# Patient Record
Sex: FEMALE | Race: WHITE | Employment: OTHER | URBAN - METROPOLITAN AREA
[De-identification: names, ages, dates, MRNs, and addresses within clinical notes are randomized per-mention and may not be internally consistent; named-entity substitution may affect disease eponyms.]

---

## 2017-08-16 ENCOUNTER — HOSPITAL ENCOUNTER (OUTPATIENT)
Dept: GENERAL RADIOLOGY | Facility: HOSPITAL | Age: 27
Discharge: HOME | End: 2017-08-16
Attending: NURSE PRACTITIONER
Payer: MEDICARE

## 2017-08-16 ENCOUNTER — LAB ENCOUNTER (OUTPATIENT)
Dept: LAB | Age: 27
End: 2017-08-16
Attending: NURSE PRACTITIONER
Payer: MEDICARE

## 2017-08-16 DIAGNOSIS — C83.50 T LYMPHOBLASTIC LYMPHOMA (HCC): ICD-10-CM

## 2017-08-16 LAB
ALBUMIN SERPL-MCNC: 3.8 G/DL (ref 3.5–4.8)
ALP LIVER SERPL-CCNC: 75 U/L (ref 37–98)
ALT SERPL-CCNC: 12 U/L (ref 14–54)
AST SERPL-CCNC: 9 U/L (ref 15–41)
BASOPHILS # BLD AUTO: 0.03 X10(3) UL (ref 0–0.1)
BASOPHILS NFR BLD AUTO: 0.4 %
BILIRUB SERPL-MCNC: 0.4 MG/DL (ref 0.1–2)
BUN BLD-MCNC: 5 MG/DL (ref 8–20)
CALCIUM BLD-MCNC: 8.7 MG/DL (ref 8.3–10.3)
CHLORIDE: 107 MMOL/L (ref 101–111)
CO2: 24 MMOL/L (ref 22–32)
CREAT BLD-MCNC: 0.6 MG/DL (ref 0.55–1.02)
EOSINOPHIL # BLD AUTO: 0.11 X10(3) UL (ref 0–0.3)
EOSINOPHIL NFR BLD AUTO: 1.6 %
ERYTHROCYTE [DISTWIDTH] IN BLOOD BY AUTOMATED COUNT: 12.1 % (ref 11.5–16)
GLUCOSE BLD-MCNC: 100 MG/DL (ref 70–99)
HCT VFR BLD AUTO: 40 % (ref 34–50)
HGB BLD-MCNC: 13.2 G/DL (ref 12–16)
IMMATURE GRANULOCYTE COUNT: 0.02 X10(3) UL (ref 0–1)
IMMATURE GRANULOCYTE RATIO %: 0.3 %
LYMPHOCYTES # BLD AUTO: 2.44 X10(3) UL (ref 0.9–4)
LYMPHOCYTES NFR BLD AUTO: 35.3 %
M PROTEIN MFR SERPL ELPH: 7.2 G/DL (ref 6.1–8.3)
MCH RBC QN AUTO: 30.3 PG (ref 27–33.2)
MCHC RBC AUTO-ENTMCNC: 33 G/DL (ref 31–37)
MCV RBC AUTO: 92 FL (ref 81–100)
MONOCYTES # BLD AUTO: 0.51 X10(3) UL (ref 0.1–0.6)
MONOCYTES NFR BLD AUTO: 7.4 %
NEUTROPHIL ABS PRELIM: 3.8 X10 (3) UL (ref 1.3–6.7)
NEUTROPHILS # BLD AUTO: 3.8 X10(3) UL (ref 1.3–6.7)
NEUTROPHILS NFR BLD AUTO: 55 %
PLATELET # BLD AUTO: 260 10(3)UL (ref 150–450)
POTASSIUM SERPL-SCNC: 3.7 MMOL/L (ref 3.6–5.1)
RBC # BLD AUTO: 4.35 X10(6)UL (ref 3.8–5.1)
RED CELL DISTRIBUTION WIDTH-SD: 40.8 FL (ref 35.1–46.3)
SODIUM SERPL-SCNC: 140 MMOL/L (ref 136–144)
WBC # BLD AUTO: 6.9 X10(3) UL (ref 4–13)

## 2017-08-16 PROCEDURE — 71020 XR CHEST PA + LAT CHEST (CPT=71020): CPT | Performed by: NURSE PRACTITIONER

## 2017-08-16 PROCEDURE — 85025 COMPLETE CBC W/AUTO DIFF WBC: CPT

## 2017-08-16 PROCEDURE — 36415 COLL VENOUS BLD VENIPUNCTURE: CPT

## 2017-08-16 PROCEDURE — 80053 COMPREHEN METABOLIC PANEL: CPT

## 2018-03-07 ENCOUNTER — LAB ENCOUNTER (OUTPATIENT)
Dept: LAB | Age: 28
End: 2018-03-07
Attending: NURSE PRACTITIONER
Payer: MEDICARE

## 2018-03-07 DIAGNOSIS — C83.50 T LYMPHOBLASTIC LYMPHOMA (HCC): ICD-10-CM

## 2018-03-07 LAB
ALBUMIN SERPL-MCNC: 3.9 G/DL (ref 3.5–4.8)
ALP LIVER SERPL-CCNC: 83 U/L (ref 37–98)
ALT SERPL-CCNC: 10 U/L (ref 14–54)
AST SERPL-CCNC: 7 U/L (ref 15–41)
BASOPHILS # BLD AUTO: 0.02 X10(3) UL (ref 0–0.1)
BASOPHILS NFR BLD AUTO: 0.3 %
BILIRUB SERPL-MCNC: 0.3 MG/DL (ref 0.1–2)
BUN BLD-MCNC: 5 MG/DL (ref 8–20)
CALCIUM BLD-MCNC: 8.6 MG/DL (ref 8.3–10.3)
CHLORIDE: 108 MMOL/L (ref 101–111)
CO2: 26 MMOL/L (ref 22–32)
CREAT BLD-MCNC: 0.56 MG/DL (ref 0.55–1.02)
EOSINOPHIL # BLD AUTO: 0.2 X10(3) UL (ref 0–0.3)
EOSINOPHIL NFR BLD AUTO: 2.8 %
ERYTHROCYTE [DISTWIDTH] IN BLOOD BY AUTOMATED COUNT: 12.1 % (ref 11.5–16)
GLUCOSE BLD-MCNC: 90 MG/DL (ref 70–99)
HCT VFR BLD AUTO: 39.6 % (ref 34–50)
HGB BLD-MCNC: 13.3 G/DL (ref 12–16)
IMMATURE GRANULOCYTE COUNT: 0.02 X10(3) UL (ref 0–1)
IMMATURE GRANULOCYTE RATIO %: 0.3 %
LYMPHOCYTES # BLD AUTO: 2.19 X10(3) UL (ref 0.9–4)
LYMPHOCYTES NFR BLD AUTO: 30.2 %
M PROTEIN MFR SERPL ELPH: 7.5 G/DL (ref 6.1–8.3)
MCH RBC QN AUTO: 30.9 PG (ref 27–33.2)
MCHC RBC AUTO-ENTMCNC: 33.6 G/DL (ref 31–37)
MCV RBC AUTO: 92.1 FL (ref 81–100)
MONOCYTES # BLD AUTO: 0.58 X10(3) UL (ref 0.1–1)
MONOCYTES NFR BLD AUTO: 8 %
NEUTROPHIL ABS PRELIM: 4.24 X10 (3) UL (ref 1.3–6.7)
NEUTROPHILS # BLD AUTO: 4.24 X10(3) UL (ref 1.3–6.7)
NEUTROPHILS NFR BLD AUTO: 58.4 %
PLATELET # BLD AUTO: 278 10(3)UL (ref 150–450)
POTASSIUM SERPL-SCNC: 4.3 MMOL/L (ref 3.6–5.1)
RBC # BLD AUTO: 4.3 X10(6)UL (ref 3.8–5.1)
RED CELL DISTRIBUTION WIDTH-SD: 41 FL (ref 35.1–46.3)
SODIUM SERPL-SCNC: 140 MMOL/L (ref 136–144)
WBC # BLD AUTO: 7.3 X10(3) UL (ref 4–13)

## 2018-03-07 PROCEDURE — 36415 COLL VENOUS BLD VENIPUNCTURE: CPT

## 2018-03-07 PROCEDURE — 80053 COMPREHEN METABOLIC PANEL: CPT

## 2018-03-07 PROCEDURE — 85025 COMPLETE CBC W/AUTO DIFF WBC: CPT

## 2021-03-05 PROBLEM — R63.0 ANOREXIA: Status: ACTIVE | Noted: 2021-03-05

## 2021-03-05 PROBLEM — F50.20 BULIMIA: Status: ACTIVE | Noted: 2021-03-05

## 2021-03-05 PROBLEM — F50.2 BULIMIA (HCC): Status: ACTIVE | Noted: 2021-03-05

## 2021-03-05 PROBLEM — F50.2 BULIMIA: Status: ACTIVE | Noted: 2021-03-05

## 2021-09-08 ENCOUNTER — LAB ENCOUNTER (OUTPATIENT)
Dept: LAB | Age: 31
End: 2021-09-08
Attending: NURSE PRACTITIONER
Payer: MEDICARE

## 2021-09-08 DIAGNOSIS — Z92.21 HISTORY OF CHEMOTHERAPY: ICD-10-CM

## 2021-09-08 DIAGNOSIS — C83.50 T LYMPHOBLASTIC LYMPHOMA (HCC): ICD-10-CM

## 2021-09-08 LAB
ALBUMIN SERPL-MCNC: 3.5 G/DL (ref 3.4–5)
ALBUMIN/GLOB SERPL: 0.9 {RATIO} (ref 1–2)
ALP LIVER SERPL-CCNC: 104 U/L
ALT SERPL-CCNC: 19 U/L
ANION GAP SERPL CALC-SCNC: 8 MMOL/L (ref 0–18)
AST SERPL-CCNC: 21 U/L (ref 15–37)
BASOPHILS # BLD AUTO: 0.03 X10(3) UL (ref 0–0.2)
BASOPHILS NFR BLD AUTO: 0.6 %
BILIRUB SERPL-MCNC: 0.2 MG/DL (ref 0.1–2)
BUN BLD-MCNC: 4 MG/DL (ref 7–18)
CALCIUM BLD-MCNC: 8.7 MG/DL (ref 8.5–10.1)
CHLORIDE SERPL-SCNC: 112 MMOL/L (ref 98–112)
CO2 SERPL-SCNC: 20 MMOL/L (ref 21–32)
CREAT BLD-MCNC: 0.66 MG/DL
DEPRECATED HBV CORE AB SER IA-ACNC: 4.1 NG/ML
EOSINOPHIL # BLD AUTO: 0.09 X10(3) UL (ref 0–0.7)
EOSINOPHIL NFR BLD AUTO: 1.9 %
ERYTHROCYTE [DISTWIDTH] IN BLOOD BY AUTOMATED COUNT: 18.7 %
GLOBULIN PLAS-MCNC: 3.8 G/DL (ref 2.8–4.4)
GLUCOSE BLD-MCNC: 74 MG/DL (ref 70–99)
HCT VFR BLD AUTO: 29.8 %
HGB BLD-MCNC: 8.3 G/DL
IMM GRANULOCYTES # BLD AUTO: 0.01 X10(3) UL (ref 0–1)
IMM GRANULOCYTES NFR BLD: 0.2 %
IRON SATURATION: 3 %
IRON SERPL-MCNC: 20 UG/DL
LYMPHOCYTES # BLD AUTO: 1.09 X10(3) UL (ref 1–4)
LYMPHOCYTES NFR BLD AUTO: 23.3 %
M PROTEIN MFR SERPL ELPH: 7.3 G/DL (ref 6.4–8.2)
MAGNESIUM SERPL-MCNC: 1.9 MG/DL (ref 1.6–2.6)
MCH RBC QN AUTO: 20 PG (ref 26–34)
MCHC RBC AUTO-ENTMCNC: 27.9 G/DL (ref 31–37)
MCV RBC AUTO: 72 FL
MONOCYTES # BLD AUTO: 0.51 X10(3) UL (ref 0.1–1)
MONOCYTES NFR BLD AUTO: 10.9 %
NEUTROPHILS # BLD AUTO: 2.95 X10 (3) UL (ref 1.5–7.7)
NEUTROPHILS # BLD AUTO: 2.95 X10(3) UL (ref 1.5–7.7)
NEUTROPHILS NFR BLD AUTO: 63.1 %
OSMOLALITY SERPL CALC.SUM OF ELEC: 286 MOSM/KG (ref 275–295)
PATIENT FASTING Y/N/NP: YES
PHOSPHATE SERPL-MCNC: 4.5 MG/DL (ref 2.5–4.9)
PLATELET # BLD AUTO: 467 10(3)UL (ref 150–450)
POTASSIUM SERPL-SCNC: 4 MMOL/L (ref 3.5–5.1)
PREALB SERPL-MCNC: 24.5 MG/DL (ref 20–40)
RBC # BLD AUTO: 4.14 X10(6)UL
SODIUM SERPL-SCNC: 140 MMOL/L (ref 136–145)
TOTAL IRON BINDING CAPACITY: 586 UG/DL (ref 240–450)
TRANSFERRIN SERPL-MCNC: 393 MG/DL (ref 200–360)
TSI SER-ACNC: 1.05 MIU/ML (ref 0.36–3.74)
VIT D+METAB SERPL-MCNC: 77.1 NG/ML (ref 30–100)
WBC # BLD AUTO: 4.7 X10(3) UL (ref 4–11)

## 2021-09-08 PROCEDURE — 82728 ASSAY OF FERRITIN: CPT

## 2021-09-08 PROCEDURE — 83735 ASSAY OF MAGNESIUM: CPT

## 2021-09-08 PROCEDURE — 83540 ASSAY OF IRON: CPT

## 2021-09-08 PROCEDURE — 80053 COMPREHEN METABOLIC PANEL: CPT

## 2021-09-08 PROCEDURE — 84100 ASSAY OF PHOSPHORUS: CPT

## 2021-09-08 PROCEDURE — 83550 IRON BINDING TEST: CPT

## 2021-09-08 PROCEDURE — 84443 ASSAY THYROID STIM HORMONE: CPT

## 2021-09-08 PROCEDURE — 84134 ASSAY OF PREALBUMIN: CPT

## 2021-09-08 PROCEDURE — 82306 VITAMIN D 25 HYDROXY: CPT

## 2021-09-08 PROCEDURE — 36415 COLL VENOUS BLD VENIPUNCTURE: CPT

## 2021-09-08 PROCEDURE — 85025 COMPLETE CBC W/AUTO DIFF WBC: CPT

## 2021-11-04 ENCOUNTER — OFFICE VISIT (OUTPATIENT)
Dept: NEUROLOGY | Facility: CLINIC | Age: 31
End: 2021-11-04
Payer: MEDICARE

## 2021-11-04 VITALS
SYSTOLIC BLOOD PRESSURE: 118 MMHG | BODY MASS INDEX: 20 KG/M2 | DIASTOLIC BLOOD PRESSURE: 66 MMHG | RESPIRATION RATE: 16 BRPM | HEART RATE: 96 BPM | WEIGHT: 120 LBS

## 2021-11-04 DIAGNOSIS — M54.16 LEFT LUMBAR RADICULOPATHY: Primary | ICD-10-CM

## 2021-11-04 DIAGNOSIS — M79.605 LEFT LEG PAIN: ICD-10-CM

## 2021-11-04 DIAGNOSIS — R20.2 PARESTHESIA: ICD-10-CM

## 2021-11-04 PROCEDURE — 99204 OFFICE O/P NEW MOD 45 MIN: CPT | Performed by: OTHER

## 2021-11-04 RX ORDER — GABAPENTIN 100 MG/1
100 CAPSULE ORAL 3 TIMES DAILY
Qty: 90 CAPSULE | Refills: 2 | Status: SHIPPED | OUTPATIENT
Start: 2021-11-04 | End: 2022-02-07

## 2021-11-04 RX ORDER — FLUCONAZOLE 100 MG/1
100 TABLET ORAL DAILY
COMMUNITY
Start: 2021-11-03 | End: 2022-01-17

## 2021-11-04 RX ORDER — FLUTICASONE PROPIONATE 50 MCG
2 SPRAY, SUSPENSION (ML) NASAL DAILY
COMMUNITY
Start: 2021-04-02 | End: 2022-01-17

## 2021-11-04 RX ORDER — AZELASTINE 1 MG/ML
1 SPRAY, METERED NASAL DAILY
COMMUNITY
Start: 2021-04-02 | End: 2022-01-17

## 2021-11-04 NOTE — PROGRESS NOTES
HPI:    Patient ID: Darshana Quarles is a 32year old female.   Referring provider: Amos OBRIEN    HPI   Bernardo is a 24-year-old female with history of T-cell lymphoblastic lymphoma in remission and anxiety and depression who presents for evaluat Maternal Grandfather    • Hypertension Paternal Grandmother    • Lipids Paternal Grandmother    • Heart Disorder Paternal Grandfather         MI   • Hypertension Paternal Grandfather    • Cancer Father    • Other (Other) Father         Cyst   • Cancer [place] Bowel sounds are normal.   Musculoskeletal: Negative SLR on the left    Neurological  Mental Status Exam: Patient is awake, alert and oriented to person, place and time with normal memory, fund of knowledge, attention/concentration and language.     Cranial

## 2021-11-04 NOTE — PATIENT INSTRUCTIONS
After your visit at the [facility] office  today, please direct any follow up questions or medication needs to the staff in our [place] office so that your concerns may be promptly addressed.   We are available through [employer] or at the numbers below: be picked up in office. • Please allow the office 2-3 business days to fill the prescription. • Patient must present photo ID at time of . PLEASE NOTE: PRESCRIPTIONS MUST BE PICKED UP PRIOR TO 3:00PM MONDAY-FRIDAY    Scheduling Tests:     If your submitting forms to office staff. • Form completion may require an additional fee. • A signed Release of Information (DUSTIN) must be on file before forms may be submitted. When dropping off forms, please ask the  for this paper.    • Failure

## 2021-11-09 ENCOUNTER — HOSPITAL ENCOUNTER (OUTPATIENT)
Dept: CV DIAGNOSTICS | Facility: HOSPITAL | Age: 31
End: 2021-11-09
Attending: PEDIATRICS
Payer: MEDICARE

## 2021-11-09 ENCOUNTER — HOSPITAL ENCOUNTER (OUTPATIENT)
Dept: MRI IMAGING | Facility: HOSPITAL | Age: 31
Discharge: HOME | End: 2021-11-09
Attending: Other
Payer: MEDICARE

## 2021-11-09 DIAGNOSIS — R20.2 PARESTHESIA: ICD-10-CM

## 2021-11-09 DIAGNOSIS — M54.16 LEFT LUMBAR RADICULOPATHY: ICD-10-CM

## 2021-11-09 PROCEDURE — 72148 MRI LUMBAR SPINE W/O DYE: CPT | Performed by: OTHER

## 2021-11-16 DIAGNOSIS — M54.16 LEFT LUMBAR RADICULOPATHY: Primary | ICD-10-CM

## 2021-11-29 ENCOUNTER — TELEPHONE (OUTPATIENT)
Dept: NEUROLOGY | Facility: CLINIC | Age: 31
End: 2021-11-29

## 2021-11-29 DIAGNOSIS — C83.50 LYMPHOBLASTIC LYMPHOMA, UNSPECIFIED BODY REGION (HCC): Primary | ICD-10-CM

## 2021-11-29 NOTE — TELEPHONE ENCOUNTER
Pt states that the letter is needed by disability stating that she needs to be off due to her condition

## 2021-11-30 ENCOUNTER — NURSE ONLY (OUTPATIENT)
Dept: LAB | Facility: HOSPITAL | Age: 31
End: 2021-11-30
Attending: PEDIATRICS
Payer: MEDICARE

## 2021-11-30 DIAGNOSIS — C83.50 LYMPHOBLASTIC LYMPHOMA, UNSPECIFIED BODY REGION (HCC): ICD-10-CM

## 2021-12-01 NOTE — TELEPHONE ENCOUNTER
Per chart review, pt read [employer] message sent asking for further information 11/29/21, no response to date.   LMTCB with any questions

## 2021-12-13 ENCOUNTER — HOSPITAL ENCOUNTER (OUTPATIENT)
Dept: CV DIAGNOSTICS | Age: 31
Discharge: HOME | End: 2021-12-13
Attending: NURSE PRACTITIONER
Payer: MEDICARE

## 2021-12-13 ENCOUNTER — HOSPITAL ENCOUNTER (OUTPATIENT)
Dept: BONE DENSITY | Age: 31
Discharge: HOME | End: 2021-12-13
Attending: NURSE PRACTITIONER
Payer: MEDICARE

## 2021-12-13 DIAGNOSIS — Z91.89 AT RISK FOR OSTEOPENIA DUE TO HISTORY OF OSTEOPOROSIS: ICD-10-CM

## 2021-12-13 DIAGNOSIS — Z92.21 HISTORY OF CANCER CHEMOTHERAPY: ICD-10-CM

## 2021-12-13 DIAGNOSIS — R42 DIZZINESS: ICD-10-CM

## 2021-12-13 DIAGNOSIS — R42 INTERMITTENT LIGHTHEADEDNESS: ICD-10-CM

## 2021-12-13 DIAGNOSIS — Z96.643 HISTORY OF BILATERAL HIP REPLACEMENTS: ICD-10-CM

## 2021-12-13 PROCEDURE — 93306 TTE W/DOPPLER COMPLETE: CPT | Performed by: NURSE PRACTITIONER

## 2021-12-13 PROCEDURE — 77080 DXA BONE DENSITY AXIAL: CPT | Performed by: NURSE PRACTITIONER

## 2021-12-20 ENCOUNTER — OFFICE VISIT (OUTPATIENT)
Dept: PHYSICAL THERAPY | Facility: HOSPITAL | Age: 31
End: 2021-12-20
Attending: Other
Payer: MEDICARE

## 2021-12-20 DIAGNOSIS — M54.16 LEFT LUMBAR RADICULOPATHY: ICD-10-CM

## 2021-12-20 PROCEDURE — 97162 PT EVAL MOD COMPLEX 30 MIN: CPT

## 2021-12-20 PROCEDURE — 97110 THERAPEUTIC EXERCISES: CPT

## 2021-12-20 NOTE — PROGRESS NOTES
SPINE EVALUATION:   Referring Physician: Dr. Sara Kay  Diagnosis: Oral [place] lumbar radiculopathy.       Date of Service: 12/20/2021     PATIENT Susanne Obregon is a 32year old female who presents to therapy today with complaints of a long hist consistent with diagnosis of lumbar radiculopathy. Pt and PT discussed evaluation findings, pathology, POC and HEP. Pt voiced understanding and performs HEP correctly without reported pain.  Skilled Physical Therapy is medically necessary to address the ab Charges: PT Eval Moderate Complexity, TherEx1.       Total Timed Treatment: 10 min     Total Treatment Time: 40 min     Based on clinical rationale and outcome measures, this evaluation involved Moderate Complexity decision making due to 3+ personal fac

## 2021-12-22 ENCOUNTER — OFFICE VISIT (OUTPATIENT)
Dept: PHYSICAL THERAPY | Facility: HOSPITAL | Age: 31
End: 2021-12-22
Attending: Other
Payer: MEDICARE

## 2021-12-22 PROCEDURE — 97110 THERAPEUTIC EXERCISES: CPT

## 2021-12-22 PROCEDURE — 97140 MANUAL THERAPY 1/> REGIONS: CPT

## 2021-12-22 NOTE — PROGRESS NOTES
Dx: Left lumbar radiculopathy. Insurance (Authorized # of Visits):  Medicare; 10. ...            Authorizing Physician: Dr. Marcie Loving  Next MD visit: ?  Fall Risk: standard         Precautions: n/a             Subjective:   Left l-s and LE burning zuleika

## 2021-12-27 ENCOUNTER — OFFICE VISIT (OUTPATIENT)
Dept: PHYSICAL THERAPY | Facility: HOSPITAL | Age: 31
End: 2021-12-27
Attending: Other
Payer: MEDICARE

## 2021-12-27 PROCEDURE — 97140 MANUAL THERAPY 1/> REGIONS: CPT

## 2021-12-27 PROCEDURE — 97110 THERAPEUTIC EXERCISES: CPT

## 2021-12-27 NOTE — PROGRESS NOTES
Dx: Left lumbar radiculopathy. Insurance (Authorized # of Visits):  Medicare; 10. ...            Authorizing Physician: Dr. Schulte July  Next MD visit: ?  Fall Risk: standard         Precautions: n/a             Subjective:   Left l-s and LE burning zuleika eachabeg. Wooden balance board a/p x 20. Wooden balance board a/p x 20.       4\" LSU x 10 each leg. Airex DL standing:   EC for 1 minute, marching x 10 l/r, B arm lifts x 10, sit-backs x 10. X 12 each leg.      Airex DL standing:   EC for 1 delaney

## 2021-12-28 ENCOUNTER — IMMUNIZATION (OUTPATIENT)
Dept: LAB | Facility: HOSPITAL | Age: 31
End: 2021-12-28
Attending: EMERGENCY MEDICINE
Payer: MEDICARE

## 2021-12-28 DIAGNOSIS — Z23 NEED FOR VACCINATION: Primary | ICD-10-CM

## 2021-12-28 PROCEDURE — 0004A SARSCOV2 VAC 30MCG/0.3ML IM: CPT

## 2021-12-29 ENCOUNTER — LAB ENCOUNTER (OUTPATIENT)
Dept: LAB | Age: 31
End: 2021-12-29
Attending: NURSE PRACTITIONER
Payer: MEDICARE

## 2021-12-29 ENCOUNTER — APPOINTMENT (OUTPATIENT)
Dept: PHYSICAL THERAPY | Facility: HOSPITAL | Age: 31
End: 2021-12-29
Attending: Other
Payer: MEDICARE

## 2021-12-29 DIAGNOSIS — D50.8 IRON DEFICIENCY ANEMIA SECONDARY TO INADEQUATE DIETARY IRON INTAKE: ICD-10-CM

## 2021-12-29 LAB
BASOPHILS # BLD AUTO: 0.02 X10(3) UL (ref 0–0.2)
BASOPHILS NFR BLD AUTO: 0.3 %
DEPRECATED HBV CORE AB SER IA-ACNC: 3.6 NG/ML
EOSINOPHIL # BLD AUTO: 0.08 X10(3) UL (ref 0–0.7)
EOSINOPHIL NFR BLD AUTO: 1.1 %
ERYTHROCYTE [DISTWIDTH] IN BLOOD BY AUTOMATED COUNT: 19.9 %
HCT VFR BLD AUTO: 27.6 %
HGB BLD-MCNC: 7.3 G/DL
IMM GRANULOCYTES # BLD AUTO: 0.03 X10(3) UL (ref 0–1)
IMM GRANULOCYTES NFR BLD: 0.4 %
IRON SATURATION: 2 %
IRON SERPL-MCNC: 15 UG/DL
LYMPHOCYTES # BLD AUTO: 0.58 X10(3) UL (ref 1–4)
LYMPHOCYTES NFR BLD AUTO: 7.7 %
MCH RBC QN AUTO: 18.2 PG (ref 26–34)
MCHC RBC AUTO-ENTMCNC: 26.4 G/DL (ref 31–37)
MCV RBC AUTO: 68.7 FL
MONOCYTES # BLD AUTO: 0.57 X10(3) UL (ref 0.1–1)
MONOCYTES NFR BLD AUTO: 7.5 %
NEUTROPHILS # BLD AUTO: 6.29 X10 (3) UL (ref 1.5–7.7)
NEUTROPHILS # BLD AUTO: 6.29 X10(3) UL (ref 1.5–7.7)
NEUTROPHILS NFR BLD AUTO: 83 %
PLATELET # BLD AUTO: 464 10(3)UL (ref 150–450)
RBC # BLD AUTO: 4.02 X10(6)UL
TOTAL IRON BINDING CAPACITY: 653 UG/DL (ref 240–450)
TRANSFERRIN SERPL-MCNC: 438 MG/DL (ref 200–360)
WBC # BLD AUTO: 7.6 X10(3) UL (ref 4–11)

## 2021-12-29 PROCEDURE — 85025 COMPLETE CBC W/AUTO DIFF WBC: CPT

## 2021-12-29 PROCEDURE — 83550 IRON BINDING TEST: CPT

## 2021-12-29 PROCEDURE — 36415 COLL VENOUS BLD VENIPUNCTURE: CPT

## 2021-12-29 PROCEDURE — 83540 ASSAY OF IRON: CPT

## 2021-12-29 PROCEDURE — 82728 ASSAY OF FERRITIN: CPT

## 2022-01-03 ENCOUNTER — OFFICE VISIT (OUTPATIENT)
Dept: PHYSICAL THERAPY | Facility: HOSPITAL | Age: 32
End: 2022-01-03
Attending: Other
Payer: MEDICARE

## 2022-01-03 PROCEDURE — 97110 THERAPEUTIC EXERCISES: CPT

## 2022-01-03 PROCEDURE — 97140 MANUAL THERAPY 1/> REGIONS: CPT

## 2022-01-03 NOTE — PROGRESS NOTES
Dx: Left lumbar radiculopathy. Insurance (Authorized # of Visits):  Medicare; 10. ...            Authorizing Physician: Dr. Mckee Postal  Next MD visit: ?  Fall Risk: standard         Precautions: n/a             Subjective:   Left l-s and LE burning zuleika knees 3 x 8. With green band around knees 2 x 10.  3 x 10. PPT while hook lying with ball squeezing 5 second holds x 10 reps. . X 15 reps. . X 15 reps. ..     L unilateral lumbar traction while hook lying by PT for 3-4 minutes. For 3-4 minutes. .  For

## 2022-01-05 ENCOUNTER — OFFICE VISIT (OUTPATIENT)
Dept: PHYSICAL THERAPY | Facility: HOSPITAL | Age: 32
End: 2022-01-05
Attending: Other
Payer: MEDICARE

## 2022-01-05 PROCEDURE — 97110 THERAPEUTIC EXERCISES: CPT

## 2022-01-05 PROCEDURE — 97140 MANUAL THERAPY 1/> REGIONS: CPT

## 2022-01-05 NOTE — PROGRESS NOTES
Dx: Left lumbar radiculopathy. Insurance (Authorized # of Visits):  Medicare; 10. ...            Authorizing Physician: Dr. Comfort Johnson  Next MD visit: ?  Fall Risk: standard         Precautions: n/a             Subjective:   Left l-s and LE burning zuleika ankle pumps left and right. stm left TLS area by PT while right side lying. X 15 reps each leg. Supine clam shells with red band around knees 3 x 8. With green band around knees 2 x 10.  3 x 10.  3 x 10.        PPT while hook lying with ball squee

## 2022-01-10 ENCOUNTER — OFFICE VISIT (OUTPATIENT)
Dept: PHYSICAL THERAPY | Facility: HOSPITAL | Age: 32
End: 2022-01-10
Attending: Other
Payer: MEDICARE

## 2022-01-10 PROCEDURE — 97110 THERAPEUTIC EXERCISES: CPT

## 2022-01-10 PROCEDURE — 97140 MANUAL THERAPY 1/> REGIONS: CPT

## 2022-01-10 NOTE — PROGRESS NOTES
Dx: Left lumbar radiculopathy. Insurance (Authorized # of Visits):  Medicare; 10. ... Authorizing Physician: Dr. Soto Womack  Next MD visit: 2/7/22. .  Fall Risk: standard         Precautions: n/a             Subjective:   Left l-s and LE burn hook lying with ankle pumps x 10 each leg.  -    HSS by PT while hook lying with 10 ankle pumps left and right. stm left TLS area by PT while right side lying. X 15 reps each leg.  stm left TLS area by PT while right side lying.     HSS while hook lying

## 2022-01-12 ENCOUNTER — OFFICE VISIT (OUTPATIENT)
Dept: PHYSICAL THERAPY | Facility: HOSPITAL | Age: 32
End: 2022-01-12
Attending: Other
Payer: MEDICARE

## 2022-01-12 PROCEDURE — 97140 MANUAL THERAPY 1/> REGIONS: CPT

## 2022-01-12 PROCEDURE — 97110 THERAPEUTIC EXERCISES: CPT

## 2022-01-12 NOTE — PROGRESS NOTES
Dx: Left lumbar radiculopathy. Insurance (Authorized # of Visits):  Medicare; 10. ... Authorizing Physician: Dr. Araceli Carmichael  Next MD visit: 2/7/22. .  Fall Risk: standard         Precautions: n/a             Subjective:   Left l-s and LE burn 10.  2 x 10. Short range bridging with calves on SB 2 x 10. X 10 with SB. X 10 without SB use. Supine hip PROM by PT left and right.   Supine l/r hip and knee PROM by PT.   HSS by PT while hook lying with ankle pumps x 10 each leg.  -    HSS by PT w leg.     Airex:   Sit backs 2 x 8.   EC for 1 minute. B arm lifts x 10. Marching x 10 l/r.    B SR with geen band 2 x 10.  6\" LSU x 15 each. Airex DLS:  EC for 1 minute. B arm lifts x 10. Marching x 20 l/r. Sit-backs 2 x 8. 6\" LSU x 15 l/r. Vania Moss

## 2022-01-17 ENCOUNTER — OFFICE VISIT (OUTPATIENT)
Dept: PHYSICAL THERAPY | Facility: HOSPITAL | Age: 32
End: 2022-01-17
Attending: PHYSICIAN ASSISTANT
Payer: MEDICARE

## 2022-01-17 PROCEDURE — 97140 MANUAL THERAPY 1/> REGIONS: CPT

## 2022-01-17 PROCEDURE — 97110 THERAPEUTIC EXERCISES: CPT

## 2022-01-17 NOTE — PROGRESS NOTES
Dx: Left lumbar radiculopathy. Insurance (Authorized # of Visits):  Medicare; 10. ... Authorizing Physician: Dr. Diamond So  Next MD visit: 2/7/22. .  Fall Risk: standard         Precautions: n/a             Subjective:   Left l-s and LE burn 20 l/r. Beth Osorio LTR with calves on SB x 20 l/r. . X 20 l/r. .     Bridging with calves on SB, short range as comfortable; x 10. Short range bridging with calves on SB 2-3 second holds x  Short range bridging with calves on SB 2 x 10.  2 x 10.   Short range bridging balance board a/p x 25. A/P x 25.      4\" LSU x 10 each leg. Airex DL standing:   EC for 1 minute, marching x 10 l/r, B arm lifts x 10, sit-backs x 10. X 12 each leg.      Airex DL standing:   EC for 1 minute; B arm lifts x 10; marching x 10 l/r; and

## 2022-01-26 PROBLEM — D50.8 IRON DEFICIENCY ANEMIA SECONDARY TO INADEQUATE DIETARY IRON INTAKE: Status: ACTIVE | Noted: 2022-01-26

## 2022-01-26 PROBLEM — E61.1 IRON DEFICIENCY: Status: ACTIVE | Noted: 2022-01-26

## 2022-01-31 ENCOUNTER — APPOINTMENT (OUTPATIENT)
Dept: HEMATOLOGY/ONCOLOGY | Facility: HOSPITAL | Age: 32
End: 2022-01-31
Attending: NURSE PRACTITIONER

## 2022-02-01 ENCOUNTER — APPOINTMENT (OUTPATIENT)
Dept: HEMATOLOGY/ONCOLOGY | Facility: HOSPITAL | Age: 32
End: 2022-02-01
Attending: NURSE PRACTITIONER
Payer: MEDICARE

## 2022-02-03 ENCOUNTER — OFFICE VISIT (OUTPATIENT)
Dept: HEMATOLOGY/ONCOLOGY | Facility: HOSPITAL | Age: 32
End: 2022-02-03
Attending: NURSE PRACTITIONER
Payer: MEDICARE

## 2022-02-03 VITALS
RESPIRATION RATE: 18 BRPM | HEART RATE: 188 BPM | DIASTOLIC BLOOD PRESSURE: 74 MMHG | TEMPERATURE: 98 F | SYSTOLIC BLOOD PRESSURE: 94 MMHG

## 2022-02-03 NOTE — PROGRESS NOTES
Education Record    Learner:  Patient    Disease / Diagnosis:IV venofer    Barriers / Limitations:  Emotional factors   Comments:    Method:  Discussion   Comments:    General Topics:  Plan of care reviewed   Comments:    Outcome:  Shows understanding   Comments:Patient arrived for iv venofer. Patient has not received iv venofer prior to today. Patient is tearful states she is very anxious since she has had chemo in the past and being in the infusion center has brought up lots of anxiety. Patient has heart rate of 180. Rechecked three times with same result. Patient placed in room to relax to see if heart rate will come down. It remained elevated in 170's to 180's. SOPHIE Jacobs, notified, leaving voice message on her voice mail. Called the office and had her paged two times in an hour with no response until hour and a half later. Patient had been sent home and told to contact office and reschedule iron infusion when heart rate under control. Verbalizes that she has an eating disorder. Spoke with Homar Quintanilla and informed her of patient's elevated heart rate and that we could not treat her with heart rate that high. Homar Quintanilla stated she needs the iron due to eating disorder and will have her reschedule iron infusion.
Abdirashid Begum(Attending)

## 2022-02-07 ENCOUNTER — OFFICE VISIT (OUTPATIENT)
Dept: NEUROLOGY | Facility: CLINIC | Age: 32
End: 2022-02-07
Payer: MEDICARE

## 2022-02-07 VITALS
RESPIRATION RATE: 16 BRPM | HEIGHT: 64 IN | WEIGHT: 123 LBS | DIASTOLIC BLOOD PRESSURE: 72 MMHG | HEART RATE: 109 BPM | SYSTOLIC BLOOD PRESSURE: 112 MMHG | BODY MASS INDEX: 21 KG/M2

## 2022-02-07 DIAGNOSIS — M54.32 SCIATICA OF LEFT SIDE: Primary | ICD-10-CM

## 2022-02-07 DIAGNOSIS — M79.605 LEFT LEG PAIN: ICD-10-CM

## 2022-02-07 PROCEDURE — 99213 OFFICE O/P EST LOW 20 MIN: CPT | Performed by: OTHER

## 2022-02-07 RX ORDER — IBUPROFEN 200 MG
800 TABLET ORAL DAILY
COMMUNITY

## 2022-02-08 ENCOUNTER — APPOINTMENT (OUTPATIENT)
Dept: PHYSICAL THERAPY | Facility: HOSPITAL | Age: 32
End: 2022-02-08
Attending: Other
Payer: MEDICARE

## 2022-02-09 ENCOUNTER — TELEPHONE (OUTPATIENT)
Dept: PHYSICAL THERAPY | Facility: HOSPITAL | Age: 32
End: 2022-02-09

## 2022-02-10 ENCOUNTER — APPOINTMENT (OUTPATIENT)
Dept: PHYSICAL THERAPY | Facility: HOSPITAL | Age: 32
End: 2022-02-10
Attending: Other
Payer: MEDICARE

## 2022-02-11 ENCOUNTER — OFFICE VISIT (OUTPATIENT)
Dept: HEMATOLOGY/ONCOLOGY | Facility: HOSPITAL | Age: 32
End: 2022-02-11
Attending: NURSE PRACTITIONER
Payer: MEDICARE

## 2022-02-11 VITALS
OXYGEN SATURATION: 99 % | TEMPERATURE: 98 F | BODY MASS INDEX: 21 KG/M2 | SYSTOLIC BLOOD PRESSURE: 118 MMHG | RESPIRATION RATE: 16 BRPM | WEIGHT: 124.81 LBS | HEART RATE: 80 BPM | DIASTOLIC BLOOD PRESSURE: 79 MMHG

## 2022-02-11 DIAGNOSIS — D50.8 IRON DEFICIENCY ANEMIA SECONDARY TO INADEQUATE DIETARY IRON INTAKE: Primary | ICD-10-CM

## 2022-02-11 PROCEDURE — 96365 THER/PROPH/DIAG IV INF INIT: CPT

## 2022-02-11 NOTE — PROGRESS NOTES
Pt here for Venofer. Arrives Ambulating independently, accompanied by Self           Pregnancy screening: Denies possibility of pregnancy    Modifications in dose or schedule: No     Frequency of blood return and site check throughout administration: Prior to administration and At completion of therapy   Discharged to Home, Ambulating independently, accompanied by:Self    Outpatient Oncology Care Plan  Problem list:  anemia  knowledge deficit  Problems related to:    disease/disease progression  Interventions:  maintain safe environment  provided general teaching  Expected outcomes:  safe in environment  understands plan of care  Progress towards outcome:  making progress    Education Record    Learner:  Patient  Barriers / Limitations:  None  Method:  Brief focused and Reinforcement  Outcome:  Shows understanding  Comments:  Venofer infused without any complications. Observed for half hour after infusion. Vital signs taken at the end of the 30-minute observation. Patient denied any complaints/issues. Discharged in good condition. Advised to call for any questions/concerns/issues.

## 2022-02-18 ENCOUNTER — OFFICE VISIT (OUTPATIENT)
Dept: HEMATOLOGY/ONCOLOGY | Facility: HOSPITAL | Age: 32
End: 2022-02-18
Attending: NURSE PRACTITIONER
Payer: MEDICARE

## 2022-02-18 VITALS
WEIGHT: 121.63 LBS | OXYGEN SATURATION: 100 % | HEART RATE: 77 BPM | RESPIRATION RATE: 20 BRPM | BODY MASS INDEX: 21 KG/M2 | SYSTOLIC BLOOD PRESSURE: 106 MMHG | DIASTOLIC BLOOD PRESSURE: 68 MMHG | TEMPERATURE: 97 F

## 2022-02-18 DIAGNOSIS — D50.8 IRON DEFICIENCY ANEMIA SECONDARY TO INADEQUATE DIETARY IRON INTAKE: ICD-10-CM

## 2022-02-18 DIAGNOSIS — E61.1 IRON DEFICIENCY: Primary | ICD-10-CM

## 2022-02-18 LAB
BASOPHILS # BLD AUTO: 0.01 X10(3) UL (ref 0–0.2)
BASOPHILS NFR BLD AUTO: 0.2 %
EOSINOPHIL # BLD AUTO: 0.05 X10(3) UL (ref 0–0.7)
EOSINOPHIL NFR BLD AUTO: 1.1 %
ERYTHROCYTE [DISTWIDTH] IN BLOOD BY AUTOMATED COUNT: 25.7 %
HCT VFR BLD AUTO: 25.6 %
HGB BLD-MCNC: 6.7 G/DL
IMM GRANULOCYTES # BLD AUTO: 0.02 X10(3) UL (ref 0–1)
IMM GRANULOCYTES NFR BLD: 0.4 %
LYMPHOCYTES # BLD AUTO: 1.26 X10(3) UL (ref 1–4)
LYMPHOCYTES NFR BLD AUTO: 27.3 %
MCH RBC QN AUTO: 19.5 PG (ref 26–34)
MCHC RBC AUTO-ENTMCNC: 26.2 G/DL (ref 31–37)
MCV RBC AUTO: 74.4 FL
MONOCYTES # BLD AUTO: 0.5 X10(3) UL (ref 0.1–1)
MONOCYTES NFR BLD AUTO: 10.8 %
NEUTROPHILS # BLD AUTO: 2.77 X10 (3) UL (ref 1.5–7.7)
NEUTROPHILS # BLD AUTO: 2.77 X10(3) UL (ref 1.5–7.7)
NEUTROPHILS NFR BLD AUTO: 60.2 %
PLATELET # BLD AUTO: 428 10(3)UL (ref 150–450)
PLATELET MORPHOLOGY: NORMAL
RBC # BLD AUTO: 3.44 X10(6)UL
WBC # BLD AUTO: 4.6 X10(3) UL (ref 4–11)

## 2022-02-18 PROCEDURE — 85025 COMPLETE CBC W/AUTO DIFF WBC: CPT

## 2022-02-18 PROCEDURE — 96374 THER/PROPH/DIAG INJ IV PUSH: CPT

## 2022-02-18 NOTE — PROGRESS NOTES
Pt here for iron infusion. Arrives Ambulating independently, accompanied by Other self           Modifications in dose or schedule: No     Frequency of blood return and site check throughout administration: Prior to administration   Discharged to Home, Ambulating independently, accompanied by: Other self    Outpatient Oncology Care Plan  Problem list:  anemia  Problems related to:    disease/disease progression  Interventions:  administered iron  Expected outcomes:  optimal lab values  Progress towards outcome:  making progress    Education Record    Learner:  Patient  Barriers / Limitations:  None  Method:  Brief focused  Outcome:  Shows understanding  Comments:observed pt 30 min post infusion. Pt tolerated infusion. No c/o. VSS. See flowsheet    Critical hgb 6.7, called and left message for [employer] @ [facility]. Awaiting call back. Received call from [employer], repeat labs next week.

## 2022-02-25 ENCOUNTER — OFFICE VISIT (OUTPATIENT)
Dept: HEMATOLOGY/ONCOLOGY | Facility: HOSPITAL | Age: 32
End: 2022-02-25
Attending: NURSE PRACTITIONER
Payer: MEDICARE

## 2022-02-25 VITALS
RESPIRATION RATE: 18 BRPM | HEART RATE: 84 BPM | WEIGHT: 122.63 LBS | BODY MASS INDEX: 20.93 KG/M2 | TEMPERATURE: 98 F | SYSTOLIC BLOOD PRESSURE: 110 MMHG | HEIGHT: 64.02 IN | DIASTOLIC BLOOD PRESSURE: 67 MMHG | OXYGEN SATURATION: 100 %

## 2022-02-25 DIAGNOSIS — D50.8 IRON DEFICIENCY ANEMIA SECONDARY TO INADEQUATE DIETARY IRON INTAKE: ICD-10-CM

## 2022-02-25 DIAGNOSIS — E61.1 IRON DEFICIENCY: Primary | ICD-10-CM

## 2022-02-25 PROCEDURE — 96365 THER/PROPH/DIAG IV INF INIT: CPT

## 2022-02-25 NOTE — PROGRESS NOTES
Education Record    Learner:  Patient    Disease / Diagnosis: Iron Deficiency    Barriers / Limitations:  None   Comments:    Method:  Discussion   Comments:    General Topics:  Plan of care reviewed   Comments:    Outcome:  Shows understanding   Comments: Pt tolerated inf well. Will continue to monitor.

## 2022-03-17 ENCOUNTER — LAB ENCOUNTER (OUTPATIENT)
Dept: LAB | Age: 32
End: 2022-03-17
Attending: NURSE PRACTITIONER
Payer: MEDICARE

## 2022-03-17 DIAGNOSIS — D50.8 IRON DEFICIENCY ANEMIA SECONDARY TO INADEQUATE DIETARY IRON INTAKE: ICD-10-CM

## 2022-03-17 LAB
BASOPHILS # BLD AUTO: 0.04 X10(3) UL (ref 0–0.2)
BASOPHILS NFR BLD AUTO: 0.8 %
DEPRECATED HBV CORE AB SER IA-ACNC: 518.7 NG/ML
EOSINOPHIL # BLD AUTO: 0.06 X10(3) UL (ref 0–0.7)
EOSINOPHIL NFR BLD AUTO: 1.2 %
HCT VFR BLD AUTO: 41.9 %
HGB BLD-MCNC: 12.2 G/DL
IMM GRANULOCYTES # BLD AUTO: 0.01 X10(3) UL (ref 0–1)
IMM GRANULOCYTES NFR BLD: 0.2 %
IRON SATN MFR SERPL: 24 %
IRON SERPL-MCNC: 83 UG/DL
LYMPHOCYTES # BLD AUTO: 1.15 X10(3) UL (ref 1–4)
LYMPHOCYTES NFR BLD AUTO: 23 %
MCH RBC QN AUTO: 24.8 PG (ref 26–34)
MCHC RBC AUTO-ENTMCNC: 29.1 G/DL (ref 31–37)
MCV RBC AUTO: 85.2 FL
MONOCYTES # BLD AUTO: 0.36 X10(3) UL (ref 0.1–1)
MONOCYTES NFR BLD AUTO: 7.2 %
NEUTROPHILS # BLD AUTO: 3.37 X10 (3) UL (ref 1.5–7.7)
NEUTROPHILS # BLD AUTO: 3.37 X10(3) UL (ref 1.5–7.7)
NEUTROPHILS NFR BLD AUTO: 67.6 %
PLATELET # BLD AUTO: 262 10(3)UL (ref 150–450)
PLATELET MORPHOLOGY: NORMAL
RBC # BLD AUTO: 4.92 X10(6)UL
TIBC SERPL-MCNC: 346 UG/DL (ref 240–450)
TRANSFERRIN SERPL-MCNC: 232 MG/DL (ref 200–360)
WBC # BLD AUTO: 5 X10(3) UL (ref 4–11)

## 2022-03-17 PROCEDURE — 85025 COMPLETE CBC W/AUTO DIFF WBC: CPT

## 2022-03-17 PROCEDURE — 82728 ASSAY OF FERRITIN: CPT

## 2022-03-17 PROCEDURE — 36415 COLL VENOUS BLD VENIPUNCTURE: CPT

## 2022-03-17 PROCEDURE — 83540 ASSAY OF IRON: CPT

## 2022-03-17 PROCEDURE — 83550 IRON BINDING TEST: CPT

## 2023-10-16 ENCOUNTER — OFFICE VISIT (OUTPATIENT)
Dept: OTHER | Facility: HOSPITAL | Age: 33
End: 2023-10-16
Attending: NURSE PRACTITIONER

## 2023-10-16 DIAGNOSIS — Z01.84 IMMUNITY STATUS TESTING: Primary | ICD-10-CM

## 2023-10-16 LAB
HBV SURFACE AB SER QL: NONREACTIVE
HBV SURFACE AB SERPL IA-ACNC: <3.1 MIU/ML
RUBV IGG SER QL: POSITIVE
RUBV IGG SER-ACNC: 11.2 IU/ML (ref 10–?)

## 2023-10-16 PROCEDURE — 86765 RUBEOLA ANTIBODY: CPT

## 2023-10-16 PROCEDURE — 86787 VARICELLA-ZOSTER ANTIBODY: CPT

## 2023-10-16 PROCEDURE — 86762 RUBELLA ANTIBODY: CPT

## 2023-10-16 PROCEDURE — 86706 HEP B SURFACE ANTIBODY: CPT

## 2023-10-16 PROCEDURE — 86735 MUMPS ANTIBODY: CPT

## 2023-10-18 LAB
MEV IGG SER-ACNC: 38.3 AU/ML (ref 16.5–?)
MUV IGG SER IA-ACNC: <5 AU/ML (ref 11–?)
VZV IGG SER IA-ACNC: 864.2 (ref 165–?)

## 2023-11-02 ENCOUNTER — LAB ENCOUNTER (OUTPATIENT)
Dept: LAB | Age: 33
End: 2023-11-02
Attending: NURSE PRACTITIONER
Payer: MEDICARE

## 2023-11-02 DIAGNOSIS — Z86.2 HISTORY OF IMMUNOCOMPROMISED STATE: ICD-10-CM

## 2023-11-02 PROCEDURE — 86480 TB TEST CELL IMMUN MEASURE: CPT

## 2023-11-02 PROCEDURE — 36415 COLL VENOUS BLD VENIPUNCTURE: CPT

## 2023-11-06 ENCOUNTER — APPOINTMENT (OUTPATIENT)
Dept: OTHER | Facility: HOSPITAL | Age: 33
End: 2023-11-06
Attending: PREVENTIVE MEDICINE

## 2023-11-06 LAB
M TB IFN-G CD4+ T-CELLS BLD-ACNC: 0.02 IU/ML
M TB TUBERC IFN-G BLD QL: NEGATIVE
M TB TUBERC IGNF/MITOGEN IGNF CONTROL: >10 IU/ML
QFT TB1 AG MINUS NIL: 0 IU/ML
QFT TB2 AG MINUS NIL: -0.01 IU/ML

## 2023-11-27 ENCOUNTER — OFFICE VISIT (OUTPATIENT)
Dept: OTHER | Facility: HOSPITAL | Age: 33
End: 2023-11-27
Attending: PREVENTIVE MEDICINE

## 2023-11-27 DIAGNOSIS — Z01.84 IMMUNITY STATUS TESTING: Primary | ICD-10-CM

## 2023-11-27 PROCEDURE — 86735 MUMPS ANTIBODY: CPT

## 2023-11-29 LAB — MUV IGG SER IA-ACNC: 165 AU/ML (ref 11–?)

## 2023-12-04 ENCOUNTER — APPOINTMENT (OUTPATIENT)
Dept: OTHER | Facility: HOSPITAL | Age: 33
End: 2023-12-04
Attending: PREVENTIVE MEDICINE

## 2024-05-06 ENCOUNTER — APPOINTMENT (OUTPATIENT)
Dept: OTHER | Facility: HOSPITAL | Age: 34
End: 2024-05-06
Attending: PREVENTIVE MEDICINE

## 2024-06-03 ENCOUNTER — OFFICE VISIT (OUTPATIENT)
Dept: OTHER | Facility: HOSPITAL | Age: 34
End: 2024-06-03
Attending: PREVENTIVE MEDICINE

## 2024-06-03 DIAGNOSIS — Z02.89 VISIT FOR OCCUPATIONAL HEALTH EXAMINATION: Primary | ICD-10-CM

## 2024-06-03 LAB
HBV SURFACE AB SER QL: REACTIVE
HBV SURFACE AB SERPL IA-ACNC: >1000 MIU/ML

## 2024-06-03 PROCEDURE — 86706 HEP B SURFACE ANTIBODY: CPT

## 2024-11-05 ENCOUNTER — LAB ENCOUNTER (OUTPATIENT)
Dept: LAB | Age: 34
End: 2024-11-05
Attending: NURSE PRACTITIONER
Payer: MEDICARE

## 2024-11-05 DIAGNOSIS — Z85.72 HISTORY OF LYMPHOMA: ICD-10-CM

## 2024-11-05 DIAGNOSIS — Z86.2 HISTORY OF IMMUNOCOMPROMISED STATE: ICD-10-CM

## 2024-11-05 LAB
RUBV IGG SER QL: POSITIVE
RUBV IGG SER-ACNC: 21 IU/ML (ref 10–?)

## 2024-11-05 PROCEDURE — 36415 COLL VENOUS BLD VENIPUNCTURE: CPT

## 2024-11-05 PROCEDURE — 86765 RUBEOLA ANTIBODY: CPT

## 2024-11-05 PROCEDURE — 86735 MUMPS ANTIBODY: CPT

## 2024-11-05 PROCEDURE — 86762 RUBELLA ANTIBODY: CPT

## 2024-11-06 ENCOUNTER — LAB ENCOUNTER (OUTPATIENT)
Dept: LAB | Facility: HOSPITAL | Age: 34
End: 2024-11-06
Attending: NURSE PRACTITIONER
Payer: MEDICARE

## 2024-11-06 ENCOUNTER — OFFICE VISIT (OUTPATIENT)
Dept: FAMILY MEDICINE CLINIC | Facility: CLINIC | Age: 34
End: 2024-11-06
Payer: MEDICARE

## 2024-11-06 VITALS
HEART RATE: 82 BPM | RESPIRATION RATE: 18 BRPM | WEIGHT: 118 LBS | BODY MASS INDEX: 19.9 KG/M2 | OXYGEN SATURATION: 98 % | HEIGHT: 64.5 IN | DIASTOLIC BLOOD PRESSURE: 82 MMHG | SYSTOLIC BLOOD PRESSURE: 124 MMHG

## 2024-11-06 DIAGNOSIS — Z23 NEED FOR VACCINATION: ICD-10-CM

## 2024-11-06 DIAGNOSIS — Z02.0 SCHOOL PHYSICAL EXAM: ICD-10-CM

## 2024-11-06 DIAGNOSIS — Z00.00 ENCOUNTER FOR ROUTINE HISTORY AND PHYSICAL EXAMINATION: Primary | ICD-10-CM

## 2024-11-06 DIAGNOSIS — R06.2 WHEEZING: ICD-10-CM

## 2024-11-06 DIAGNOSIS — Z92.21 HISTORY OF ANTINEOPLASTIC CHEMOTHERAPY: ICD-10-CM

## 2024-11-06 LAB
MEV IGG SER-ACNC: 58 AU/ML (ref 16.5–?)
MUV IGG SER IA-ACNC: 57.9 AU/ML (ref 11–?)

## 2024-11-06 PROCEDURE — 86480 TB TEST CELL IMMUN MEASURE: CPT

## 2024-11-06 PROCEDURE — 36415 COLL VENOUS BLD VENIPUNCTURE: CPT

## 2024-11-06 PROCEDURE — 86787 VARICELLA-ZOSTER ANTIBODY: CPT

## 2024-11-06 RX ORDER — FLUTICASONE PROPIONATE 50 MCG
SPRAY, SUSPENSION (ML) NASAL
COMMUNITY
Start: 2024-09-24

## 2024-11-06 RX ORDER — CETIRIZINE HYDROCHLORIDE 10 MG/1
10 TABLET ORAL DAILY
COMMUNITY

## 2024-11-06 RX ORDER — ALBUTEROL SULFATE 90 UG/1
2 INHALANT RESPIRATORY (INHALATION) EVERY 6 HOURS PRN
Qty: 18 G | Refills: 0 | Status: SHIPPED | OUTPATIENT
Start: 2024-11-06

## 2024-11-06 RX ORDER — DEXTROAMPHETAMINE SACCHARATE, AMPHETAMINE ASPARTATE, DEXTROAMPHETAMINE SULFATE AND AMPHETAMINE SULFATE 5; 5; 5; 5 MG/1; MG/1; MG/1; MG/1
20 TABLET ORAL
COMMUNITY
Start: 2023-11-04

## 2024-11-08 LAB
M TB IFN-G CD4+ T-CELLS BLD-ACNC: 0 IU/ML
M TB TUBERC IFN-G BLD QL: NEGATIVE
M TB TUBERC IGNF/MITOGEN IGNF CONTROL: >10 IU/ML
QFT TB1 AG MINUS NIL: 0 IU/ML
QFT TB2 AG MINUS NIL: 0 IU/ML
VZV IGG SER IA-ACNC: 5.98 (ref 1–?)

## 2024-11-09 ENCOUNTER — PATIENT MESSAGE (OUTPATIENT)
Dept: FAMILY MEDICINE CLINIC | Facility: CLINIC | Age: 34
End: 2024-11-09

## 2024-11-12 NOTE — PROGRESS NOTES
[facility]    CC: yearly exam     HPI: Luz Maria Nails is 34 year old female here for a yearly physical.    2. Hx lymphoma - continues to see heme onc  3. Asthma. Albuterol PRN. Flonase.  4. Contraception - IUD    PMH:  Patient Active Problem List   Diagnosis    Tobacco abuse    Asthma (HCC)    T lymphoblastic lymphoma (HCC)    Stomatitis and mucositis    Anemia associated with chemotherapy    Constipation due to pain medication    Thrombocytopenia (HCC)    Mucositis    Neutropenia (HCC)    Encounter for antineoplastic chemotherapy    AVN (avascular necrosis of bone) (HCC)    S/P shoulder hemiarthroplasty    Depressive disorder    History of blood transfusion    Bacterial vaginosis    Lymphoblastic lymphoma of multiple sites (HCC)    S/P total hip arthroplasty    Anorexia    Bulimia    Iron deficiency    Iron deficiency anemia secondary to inadequate dietary iron intake     Last Physical 11/6/24     SH: reviewed     FH: reviewed     Social History     Social History Narrative    Not on file        ROS: full 10 point review of systems done and otherwise negative.      Healthcare Maintenance:  Health Maintenance   Topic Date Due    Annual Physical  Never done    Asthma Control Test  Never done    Zoster Vaccines (1 of 2) Never done    Annual Depression Screening  Never done    Tobacco Cessation Counseling  Never done    COVID-19 Vaccine (4 - 2023-24 season) 09/01/2024    Pap Smear  07/13/2025    DTaP,Tdap,and Td Vaccines (3 - Td or Tdap) 10/03/2033    Influenza Vaccine  Completed    Pneumococcal Vaccine: Birth to 64yrs  Completed          PE:  Vital Signs    11/06/24 1431   BP: 124/82   Pulse: 82   Resp: 18   PainSc: 0 - (None)     Wt Readings from Last 3 Encounters:   11/06/24 118 lb (53.5 kg)   02/25/22 122 lb 9.6 oz (55.6 kg)   02/18/22 121 lb 9.6 oz (55.2 kg)     Body mass index is 19.94 kg/m².     General: Comfortable, pleasant, NAD, appears stated age  HEENT.  NC/AT, no conjunctival  injection, TMs clear, oropharynx without erythema or exudate, neck supple, no LAD or thyromegaly  CV.  RRR, no m/r/g  Pulm. CTAB, no W/R/R, comfortable work of breathing, speaks in full sentences  Abdomen. Soft, nt, nd  .  Normal external and internal genitalia, bimanual exam without terrell  Extremities.  2+ DP pulses, no edema  Skin.  No concerning moles      [facility] Lab Encounter on 11/06/2024   Component Date Value Ref Range Status    Quantiferon TB NIL 11/06/2024 0.00  IU/mL Final    Quantiferon TB1 minus NIL 11/06/2024 0.00  IU/mL Final    Quantiferon TB2 minus NIL 11/06/2024 0.00  IU/mL Final    Quantiferon TB Mitogen minus NIL 11/06/2024 >10.00  IU/mL Final    Quantiferon TB Result 11/06/2024 Negative  Negative Final    V. Zoster IgG Immunity 11/06/2024 5.980  >1.000 Final   [facility] Lab Encounter on 11/05/2024   Component Date Value Ref Range Status    Rubeola IgG Immunity 11/05/2024 58.0  >=16.5 AU/mL Final    Mumps IgG Immuity 11/05/2024 57.9  >=11.0 AU/mL Final    Rubella IgG 11/05/2024 Positive  Positive Final    Rubella IgG Quantitative 11/05/2024 21  >=10 IU/mL Final        A/P: Luz Maria Nails is 34 year old yo female here for complete physical.  1. Healthcare Maintenance. Discussed eye exam, dentist visit q6 months, sunscreen, exercise at least 5x/week, 30 minutes daily, and limiting caffeine intake.   1. Wheezing  - albuterol 108 (90 Base) MCG/ACT Inhalation Aero Soln; Inhale 2 puffs into the lungs every 6 (six) hours as needed.  Dispense: 18 g; Refill: 0    2. Need for vaccination  - Fluzone trivalent vaccine, PF 0.5mL, 6mo+ (15687)  - Prevnar 20 (PCV20) [56071]    3. School physical exam  - Varicella IgG ([employer]) [E]; Future    4. Encounter for routine history and physical examination       Encounter Medications[1]        Side effects, risks and benefits of medications were explained.  The patient or responsible adult showed the ability to learn, asked appropriate questions.  There were no  barriers to learning and they verbalized understanding of the treatment plan.     Medication list provided to patient and /or family member.     This note was created in part by Dragon recognition software.  Please excuse errors.               [1]   Outpatient Encounter Medications as of 11/6/2024   Medication Sig Dispense Refill    amphetamine-dextroamphetamine 20 MG Oral Tab Take 1 tablet (20 mg total) by mouth.      cetirizine 10 MG Oral Tab Take 1 tablet (10 mg total) by mouth daily.      fluticasone propionate (FLONASE ALLERGY RELIEF) 50 MCG/ACT Nasal Suspension       Levonorgestrel 20 MCG/DAY Intrauterine IUD 20 mcg by Other route As Directed.      Multiple Vitamins-Minerals (MULTI VITAMIN/MINERALS) Oral Tab Take by mouth.      albuterol 108 (90 Base) MCG/ACT Inhalation Aero Soln Inhale 2 puffs into the lungs every 6 (six) hours as needed. 18 g 0    ibuprofen (ADVIL) 200 MG Oral Tab Take 4 tablets (800 mg total) by mouth daily.       No facility-administered encounter medications on file as of 11/6/2024.

## 2024-12-04 ENCOUNTER — OFFICE VISIT (OUTPATIENT)
Dept: FAMILY MEDICINE CLINIC | Facility: CLINIC | Age: 34
End: 2024-12-04
Payer: MEDICARE

## 2024-12-04 VITALS
OXYGEN SATURATION: 98 % | DIASTOLIC BLOOD PRESSURE: 60 MMHG | BODY MASS INDEX: 19.39 KG/M2 | WEIGHT: 115 LBS | HEIGHT: 64.5 IN | HEART RATE: 91 BPM | SYSTOLIC BLOOD PRESSURE: 122 MMHG

## 2024-12-04 DIAGNOSIS — Z71.6 ENCOUNTER FOR TOBACCO USE CESSATION COUNSELING: ICD-10-CM

## 2024-12-04 DIAGNOSIS — J45.21 MILD INTERMITTENT ASTHMA WITH EXACERBATION (HCC): Primary | ICD-10-CM

## 2024-12-04 DIAGNOSIS — C83.50 T LYMPHOBLASTIC LYMPHOMA (HCC): ICD-10-CM

## 2024-12-04 DIAGNOSIS — R59.0 LAD (LYMPHADENOPATHY) OF RIGHT CERVICAL REGION: ICD-10-CM

## 2024-12-04 PROCEDURE — 99214 OFFICE O/P EST MOD 30 MIN: CPT | Performed by: STUDENT IN AN ORGANIZED HEALTH CARE EDUCATION/TRAINING PROGRAM

## 2024-12-04 RX ORDER — PREDNISONE 20 MG/1
40 TABLET ORAL DAILY
Qty: 10 TABLET | Refills: 0 | Status: SHIPPED | OUTPATIENT
Start: 2024-12-04 | End: 2024-12-09

## 2024-12-04 RX ORDER — ALBUTEROL SULFATE 5 MG/ML
2.5 SOLUTION RESPIRATORY (INHALATION) ONCE
Status: COMPLETED | OUTPATIENT
Start: 2024-12-04 | End: 2024-12-04

## 2024-12-04 RX ADMIN — ALBUTEROL SULFATE 2.5 MG: 5 SOLUTION RESPIRATORY (INHALATION) at 17:37:00

## 2024-12-04 NOTE — PROGRESS NOTES
[facility]    Chief Complaint   Patient presents with    Cold     Sneezing, cough, and chest congestion and some wheezing. Using inhaler, wondering if it is a bad sinus infection        HPI:   Luz Maria Nails is 34 year old patient presenting for the followin. Cough and congestion. Patient was in her usual state of health until  1 month ago when she had sinus congestion and illness. Symptoms completely resolved and then symptoms returned. She is feeling a lot of congestion at this time. She is also having a cough. This round of symptoms started 3 days ago. No pain in her maxillary sinuses but she is having pain in the frontal sinuses.  She is feeling dyspneic and is using albuterol every 4-6 hours. She feels symptoms were worst lat night. She took mucinex nighttime.      2. Tobacco use. Smoking 5 cigarettes per day. Trying to cut back    3.  Right posterior-auricular LAD - she is uncertain how long it has been present. Hard, mobile, nontender. 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      Tobacco:  Social History     Tobacco Use   Smoking Status Every Day    Current packs/day: 0.50    Average packs/day: 0.5 packs/day for 8.0 years (4.0 ttl pk-yrs)    Types: Cigarettes   Smokeless Tobacco Never   Tobacco Comments    Smoke on and off for 8 years     E-Cigarettes/Vaping       Questions Responses    E-Cigarette Use Never User           Tobacco cessation counseling for <3 minutes.      PMH:  1. Hx lymphoma - continues to see heme onc  2. Asthma. Albuterol PRN. Flonase.  3. Contraception - IUD       SH: reviewed     FH: reviewed        ROS: full 10 point review of systems done and otherwise negative.      Healthcare Maintenance:  Health Maintenance   Topic Date Due    Asthma Control Test  Never done    Zoster Vaccines (1 of 2) Never done    Annual Depression Screening  Never done    Tobacco Cessation Counseling  Never done    COVID-19 Vaccine  ( season) 2024    Pap Smear  2025    Annual Physical  2025    DTaP,Tdap,and Td Vaccines (3 - Td or Tdap) 10/03/2033    Influenza Vaccine  Completed    Pneumococcal Vaccine: Birth to 64yrs  Completed        PE:  Vital Signs    24 1657   PainSc: 0 - (None)     Wt Readings from Last 3 Encounters:   24 115 lb (52.2 kg)   24 118 lb (53.5 kg)   22 122 lb 9.6 oz (55.6 kg)     Body mass index is 19.43 kg/m².     Physical Exam:  GEN: Well-appearing, NAD, nontoxic  HEENT: NC/AT, PERRL, EOMI, TM without erythema or bulging bilaterally and normal ear canals, no nasal polyps, oropharynx clear without erythema or exudate, MMM  NECK: small nontender posterior cervical chain node  CARD: RRR, no m/r/g  PULM: Tight lung sounds with wheezing throughout. Pt able to speak in full sentences, no inc work of breathing  ABD: soft, nt, nd  EXT: No gross deformity  NEURO: no gross deficit  PSYCH: normal affect, thought process linear     No visits with results within 4 Week(s) from this visit.   Latest known visit with results is:   [facility] Lab Encounter on 2024   Component Date Value Ref Range Status    Quantiferon TB NIL 2024 0.00  IU/mL Final    Quantiferon TB1 minus NIL 2024 0.00  IU/mL Final    Quantiferon TB2 minus NIL 2024 0.00  IU/mL Final    Quantiferon TB Mitogen minus NIL 2024 >10.00  IU/mL Final    Quantiferon TB Result 2024 Negative  Negative Final    V. Zoster IgG Immunity 2024 5.980  >1.000 Final        A/P: Luz Maria Nails is 34 year old presenting for the followin. 1. Encounter for tobacco use cessation counseling  - Smoking Cessation less than 3 minutes    2. Mild intermittent asthma with exacerbation (HCC) - treat as asthma exacebation with prednisone burst.  Neb given in office today with some but not complete improvement in symptoms and lung exam after treatment  - predniSONE 20 MG Oral Tab; Take 2 tablets (40 mg total) by  mouth daily for 5 days.  Dispense: 10 tablet; Refill: 0  - albuterol (Ventolin) (5 MG/ML) 0.5% nebulizer solution 2.5 mg2    3. Right posterior-cervical chain LAD - she is uncertain how long it has been present. Hard, mobile, nontender. 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     Encounter Medications[1]     Side effects, risks and benefits of medications were explained.  The patient or responsible adult showed the ability to learn, asked appropriate questions.  There were no barriers to learning and they verbalized understanding of the treatment plan.     Medication list provided to patient and /or family member.        Lawanda Chahal MD         [1]   Outpatient Encounter Medications as of 12/4/2024   Medication Sig Dispense Refill    amphetamine-dextroamphetamine 20 MG Oral Tab Take 1 tablet (20 mg total) by mouth.      cetirizine 10 MG Oral Tab Take 1 tablet (10 mg total) by mouth daily.      fluticasone propionate (FLONASE ALLERGY RELIEF) 50 MCG/ACT Nasal Suspension       Levonorgestrel 20 MCG/DAY Intrauterine IUD 20 mcg by Other route As Directed.      Multiple Vitamins-Minerals (MULTI VITAMIN/MINERALS) Oral Tab Take by mouth.      albuterol 108 (90 Base) MCG/ACT Inhalation Aero Soln Inhale 2 puffs into the lungs every 6 (six) hours as needed. 18 g 0    ibuprofen (ADVIL) 200 MG Oral Tab Take 4 tablets (800 mg total) by mouth daily.       No facility-administered encounter medications on file as of 12/4/2024.

## 2025-01-05 DIAGNOSIS — R06.2 WHEEZING: ICD-10-CM

## 2025-01-06 RX ORDER — ALBUTEROL SULFATE 90 UG/1
2 INHALANT RESPIRATORY (INHALATION) EVERY 6 HOURS PRN
Qty: 18 G | Refills: 0 | Status: SHIPPED | OUTPATIENT
Start: 2025-01-06

## 2025-01-17 ENCOUNTER — TELEMEDICINE (OUTPATIENT)
Dept: TELEHEALTH | Age: 35
End: 2025-01-17
Payer: MEDICARE

## 2025-01-17 DIAGNOSIS — R21 RASH AND NONSPECIFIC SKIN ERUPTION: Primary | ICD-10-CM

## 2025-01-17 PROCEDURE — 99212 OFFICE O/P EST SF 10 MIN: CPT | Performed by: PHYSICIAN ASSISTANT

## 2025-01-17 RX ORDER — TRIAMCINOLONE ACETONIDE 1 MG/G
CREAM TOPICAL 2 TIMES DAILY PRN
Qty: 45 G | Refills: 0 | Status: SHIPPED | OUTPATIENT
Start: 2025-01-17

## 2025-01-17 NOTE — PROGRESS NOTES
Virtual/Telephone Check-In    Luz Maria Nails verbally consents to a Virtual/Telephone Check-In service on 01/17/25.  Patient has been referred to the [facility] website at [email or web address] to review the yearly Consent to Treat document.  Patient understands and accepts financial responsibility for any deductible, co-insurance and/or co-pays associated with this service.       Telehealth Verbal Consent   I conducted a telehealth visit with Luz Maria Nails today, 01/17/25, which was completed using two-way, real-time interactive audio and video communication. This has been done in good sanna to provide continuity of care in the best interest of the provider-patient relationship, due to the COVID -19 public health crisis/national emergency where restrictions of face-to-face office visits are ongoing. Every conscious effort was taken to allow for sufficient and adequate time to complete the visit.  The patient was made aware of the limitations of the telehealth visit, including treatment limitations as no physical exam could be performed.  The patient was advised to call 911 or to go to the ER in case there was an emergency.  The patient was also advised of the potential privacy & security concerns related to the telehealth platform.   The patient was made aware of where to find [facility]'s notice of privacy practices, telehealth consent form and other related consent forms and documents.  which are located on the [facility] website. The patient verbally agreed to telehealth consent form, related consents and the risks discussed.    Lastly, the patient confirmed that they were in [place].   Included in this visit, time may have been spent reviewing labs, medications, radiology tests and decision making. Appropriate medical decision-making and tests are ordered as detailed in the plan of care above.  Coding/billing information is submitted for this visit based on complexity of care and/or time spent for the  visit.    CHIEF COMPLAINT:  Chief Complaint   Patient presents with    Rash       HPI:  Luz Maria Nails is a 34 year old female who presents for a video visit.  Patient reports rash for 1 week. Noticed a raised circular area to back of neck at onset. Over the past few days rash has spread and now located on abdomen, arms and legs and into groin area. Reports mild pruritus to some areas that comes and goes. Notices areas itch more when she is stressed. States that some of the lesions/bumps go away and then more develop. The rash is characterized by pinkish red bumps with some dryness and scaling to some. Patient has treated rash with eczema cream and cortisone, zyrtec.  Associated symptoms include: +pruritus, no pain or tenderness.  Exposure: Denies new soaps, conditioners, shampoos, detergents, lotions, or foods. Was tx with abx for URI several weeks ago.  no pus,  no blood, no discharge.      Pertinent negatives include no anorexia, congestion, cough, diarrhea, eye pain, facial edema, fatigue, fever, joint pain, rhinorrhea, swollen throat or tongue, shortness of breath, sore throat or vomiting.    Current Outpatient Medications   Medication Sig Dispense Refill    triamcinolone 0.1 % External Cream Apply topically 2 (two) times daily as needed. 45 g 0    albuterol 108 (90 Base) MCG/ACT Inhalation Aero Soln Inhale 2 puffs into the lungs every 6 (six) hours as needed. 18 g 0    Pseudoephedrine-guaiFENesin (MUCINEX D OR) Take by mouth.      amphetamine-dextroamphetamine 20 MG Oral Tab Take 1 tablet (20 mg total) by mouth.      cetirizine 10 MG Oral Tab Take 1 tablet (10 mg total) by mouth daily.      fluticasone propionate (FLONASE ALLERGY RELIEF) 50 MCG/ACT Nasal Suspension       Levonorgestrel 20 MCG/DAY Intrauterine IUD 20 mcg by Other route As Directed.      Multiple Vitamins-Minerals (MULTI VITAMIN/MINERALS) Oral Tab Take by mouth.      ibuprofen (ADVIL) 200 MG Oral Tab Take 4 tablets (800 mg total) by mouth  daily.       Past Medical History:    Allergic rhinitis    Anemia    ANXIETY    Anxiety    Asthma (HCC)    Bipolar disorder (HCC)    Cancer (HCC)    DEPRESSION    Depression    Eating disorder    Esophageal reflux    Extrinsic asthma, unspecified    History of blood transfusion    Klebsiella sepsis (HCC)    Lymphoma, lymphoblastic (HCC)    Lymphoma, lymphoblastic, diffuse    Neutropenic fever (HCC)    Psychiatric disorder    anxiety    Psychiatric disorder    bipolar    Psychiatric disorder    eating disorder    PULMONARY DISEASE     Past Surgical History:   Procedure Laterality Date    Biopsy/removal, lymph node(s)  04/01/2013    Hip replacement surgery  10/2014    Both hips replaced    Knee replacement surgery  01/2015    Partial left knee replacement    Other  09/01/2014    shoulder    Other      LTHA    Other surgical history  08/01/2014    procedure to dead nerve in bilateral lower leg    Picc  04/01/2013    Port a cath access total         Social History     Socioeconomic History    Marital status: Single   Tobacco Use    Smoking status: Every Day     Current packs/day: 0.50     Average packs/day: 0.5 packs/day for 8.0 years (4.0 ttl pk-yrs)     Types: Cigarettes    Smokeless tobacco: Never    Tobacco comments:     Smoke on and off for 8 years   Vaping Use    Vaping status: Never Used   Substance and Sexual Activity    Alcohol use: Not Currently     Comment: Not a drinker anymore    Drug use: Not Currently     Frequency: 7.0 times per week     Types: Cannabis     Comment: When i had cancer in 2013 i would use cannabis frequently    Sexual activity: Yes     Partners: Male   Other Topics Concern    Caffeine Concern No    Stress Concern No    Weight Concern No    Special Diet No    Exercise Yes    Seat Belt No        REVIEW OF SYSTEMS:  GENERAL: feels well otherwise, no fever, no chills.  SKIN: Per HPI. No edema. No ulcerations.  HEENT: no rhinorrhea, edema of the lips or swelling of throat.  CARDIOVASCULAR: no  chest pains or palpitations.  LUNGS: no shortness of breath with exertion or rest. No cough or wheezing.  LYMPH: no enlargement of the lymph nodes.  NEURO: no abnormal sensation, tingling of the skin, or numbness.    EXAM:  General: Alert, Well-appearing, and In no acute distress  Respiratory:   Speaking in full sentences comfortably  Normal work of breathing  No cough during visit  Head: Normocephalic  Nose: No obvious nasal discharge.  Skin: scattered pinkish red lesions to abdomen and arms, some with dryness and scaling per pt. No facial swelling.     No results found for this or any previous visit (from the past 24 hours).    ASSESSMENT AND PLAN:  Luz Maria Nails is a 34 year old female who presents with symptoms that are consistent with    ASSESSMENT:   Encounter Diagnosis   Name Primary?    Rash and nonspecific skin eruption Yes       PLAN: Discussed possible etiologies of rash including pityriasis rosea vs allergic dermatitis. Does not seem bacterial or fungal in nature at this time. Meds as below.  See patient Instructions    Meds & Refills for this Visit:  Requested Prescriptions     Signed Prescriptions Disp Refills    triamcinolone 0.1 % External Cream 45 g 0     Sig: Apply topically 2 (two) times daily as needed.       Risks, benefits, and side effects of medication explained and discussed.    There are no Patient Instructions on file for this visit.    The patient indicates understanding of these issues and agrees to the plan.  The patient is asked to f/u with PCP if sx's persist or worsen.    Luz Maria Nails understands video visit evaluation is not a substitute for face-to-face examination or emergency care. Patient advised to go to ER or call 911 for worsening symptoms or acute distress.

## 2025-01-24 DIAGNOSIS — R06.2 WHEEZING: ICD-10-CM

## 2025-01-25 RX ORDER — ALBUTEROL SULFATE 90 UG/1
2 INHALANT RESPIRATORY (INHALATION) EVERY 6 HOURS PRN
Qty: 18 EACH | Refills: 0 | Status: SHIPPED | OUTPATIENT
Start: 2025-01-25

## 2025-01-25 NOTE — TELEPHONE ENCOUNTER
Requested Prescriptions     Signed Prescriptions Disp Refills    ALBUTEROL 108 (90 Base) MCG/ACT Inhalation Aero Soln 18 each 0     Sig: TAKE 2 PUFFS BY MOUTH EVERY 6 HOURS AS NEEDED     Authorizing Provider: BARRY PERSAUD     Ordering User: CATHI MCGEE      Refilled per protocol/OV notes

## 2025-02-18 DIAGNOSIS — R06.2 WHEEZING: ICD-10-CM

## 2025-02-18 RX ORDER — ALBUTEROL SULFATE 90 UG/1
2 INHALANT RESPIRATORY (INHALATION) EVERY 6 HOURS PRN
Qty: 18 EACH | Refills: 0 | Status: SHIPPED | OUTPATIENT
Start: 2025-02-18

## 2025-02-18 NOTE — TELEPHONE ENCOUNTER
Requested Prescriptions     Pending Prescriptions Disp Refills    ALBUTEROL 108 (90 Base) MCG/ACT Inhalation Aero Soln [Pharmacy Med Name: ALBUTEROL HFA (VENTOLIN) INH] 18 each 0     Sig: TAKE 2 PUFFS BY MOUTH EVERY 6 HOURS AS NEEDED     LOV 12/4/2024     Patient was asked to follow-up in: Follow-up not documented in note    Appointment scheduled: Visit date not found     Medication refilled per protocol.

## 2025-03-19 ENCOUNTER — TELEPHONE (OUTPATIENT)
Dept: FAMILY MEDICINE CLINIC | Facility: CLINIC | Age: 35
End: 2025-03-19

## 2025-05-20 ENCOUNTER — TELEPHONE (OUTPATIENT)
Dept: FAMILY MEDICINE CLINIC | Facility: CLINIC | Age: 35
End: 2025-05-20

## 2025-06-24 ENCOUNTER — TELEPHONE (OUTPATIENT)
Dept: FAMILY MEDICINE CLINIC | Facility: CLINIC | Age: 35
End: 2025-06-24

## 2025-07-09 ENCOUNTER — LAB ENCOUNTER (OUTPATIENT)
Dept: LAB | Facility: HOSPITAL | Age: 35
End: 2025-07-09
Attending: STUDENT IN AN ORGANIZED HEALTH CARE EDUCATION/TRAINING PROGRAM
Payer: MEDICARE

## 2025-07-09 DIAGNOSIS — D50.8 IRON DEFICIENCY ANEMIA SECONDARY TO INADEQUATE DIETARY IRON INTAKE: ICD-10-CM

## 2025-07-09 DIAGNOSIS — Z92.21 HISTORY OF CHEMOTHERAPY: ICD-10-CM

## 2025-07-09 LAB
BASOPHILS # BLD AUTO: 0.02 X10(3) UL (ref 0–0.2)
BASOPHILS NFR BLD AUTO: 0.3 %
DEPRECATED HBV CORE AB SER IA-ACNC: 20 NG/ML (ref 50–306)
EOSINOPHIL # BLD AUTO: 0.04 X10(3) UL (ref 0–0.7)
EOSINOPHIL NFR BLD AUTO: 0.7 %
ERYTHROCYTE [DISTWIDTH] IN BLOOD BY AUTOMATED COUNT: 13.1 %
HBV CORE AB SERPL QL IA: NONREACTIVE
HBV SURFACE AB SER QL: REACTIVE
HBV SURFACE AB SERPL IA-ACNC: >1000 MIU/ML
HBV SURFACE AG SER-ACNC: <0.1 [IU]/L
HBV SURFACE AG SERPL QL IA: NONREACTIVE
HCT VFR BLD AUTO: 39.7 % (ref 35–48)
HGB BLD-MCNC: 13.5 G/DL (ref 12–16)
IMM GRANULOCYTES # BLD AUTO: 0.01 X10(3) UL (ref 0–1)
IMM GRANULOCYTES NFR BLD: 0.2 %
IRON SATN MFR SERPL: 35 % (ref 15–50)
IRON SERPL-MCNC: 109 UG/DL (ref 50–170)
LYMPHOCYTES # BLD AUTO: 1.85 X10(3) UL (ref 1–4)
LYMPHOCYTES NFR BLD AUTO: 31.7 %
MCH RBC QN AUTO: 30.5 PG (ref 26–34)
MCHC RBC AUTO-ENTMCNC: 34 G/DL (ref 31–37)
MCV RBC AUTO: 89.8 FL (ref 80–100)
MONOCYTES # BLD AUTO: 0.44 X10(3) UL (ref 0.1–1)
MONOCYTES NFR BLD AUTO: 7.5 %
NEUTROPHILS # BLD AUTO: 3.48 X10 (3) UL (ref 1.5–7.7)
NEUTROPHILS # BLD AUTO: 3.48 X10(3) UL (ref 1.5–7.7)
NEUTROPHILS NFR BLD AUTO: 59.6 %
PLATELET # BLD AUTO: 319 10(3)UL (ref 150–450)
RBC # BLD AUTO: 4.42 X10(6)UL (ref 3.8–5.3)
RUBV IGG SER QL: POSITIVE
RUBV IGG SER-ACNC: 18 IU/ML (ref 10–?)
TOTAL IRON BINDING CAPACITY: 315 UG/DL (ref 250–425)
TRANSFERRIN SERPL-MCNC: 258 MG/DL (ref 250–380)
VIT D+METAB SERPL-MCNC: 49.3 NG/ML (ref 30–100)
WBC # BLD AUTO: 5.8 X10(3) UL (ref 4–11)

## 2025-07-09 PROCEDURE — 86765 RUBEOLA ANTIBODY: CPT

## 2025-07-09 PROCEDURE — 86704 HEP B CORE ANTIBODY TOTAL: CPT

## 2025-07-09 PROCEDURE — 86735 MUMPS ANTIBODY: CPT

## 2025-07-09 PROCEDURE — 83550 IRON BINDING TEST: CPT

## 2025-07-09 PROCEDURE — 86706 HEP B SURFACE ANTIBODY: CPT

## 2025-07-09 PROCEDURE — 87340 HEPATITIS B SURFACE AG IA: CPT

## 2025-07-09 PROCEDURE — 85025 COMPLETE CBC W/AUTO DIFF WBC: CPT

## 2025-07-09 PROCEDURE — 86787 VARICELLA-ZOSTER ANTIBODY: CPT

## 2025-07-09 PROCEDURE — 82728 ASSAY OF FERRITIN: CPT

## 2025-07-09 PROCEDURE — 83540 ASSAY OF IRON: CPT

## 2025-07-09 PROCEDURE — 36415 COLL VENOUS BLD VENIPUNCTURE: CPT

## 2025-07-09 PROCEDURE — 82306 VITAMIN D 25 HYDROXY: CPT

## 2025-07-09 PROCEDURE — 86762 RUBELLA ANTIBODY: CPT

## 2025-07-11 LAB
MEV IGG SER-ACNC: 64.2 AU/ML (ref 16.5–?)
MUV IGG SER IA-ACNC: 35.9 AU/ML (ref 11–?)
VZV IGG SER IA-ACNC: 5.97 (ref 1–?)

## (undated) NOTE — Clinical Note
Thanks for the consult. Likely has S1 radiculopathy and we plan to get a MRI lumbar spine. Please see the office note for details.